# Patient Record
Sex: FEMALE | Race: ASIAN
[De-identification: names, ages, dates, MRNs, and addresses within clinical notes are randomized per-mention and may not be internally consistent; named-entity substitution may affect disease eponyms.]

---

## 2021-01-19 ENCOUNTER — HOSPITAL ENCOUNTER (OUTPATIENT)
Dept: HOSPITAL 8 - WOUND | Age: 66
Discharge: HOME | End: 2021-01-19
Attending: NURSE PRACTITIONER
Payer: COMMERCIAL
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
## 2021-01-19 DIAGNOSIS — R21: Primary | ICD-10-CM

## 2021-01-19 DIAGNOSIS — I10: ICD-10-CM

## 2021-01-19 DIAGNOSIS — L08.89: ICD-10-CM
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
## 2021-01-19 DIAGNOSIS — L27.1: ICD-10-CM

## 2021-01-19 DIAGNOSIS — T46.5X5A: ICD-10-CM

## 2021-01-19 DIAGNOSIS — Z96.659: ICD-10-CM

## 2021-01-19 DIAGNOSIS — H26.9: ICD-10-CM

## 2021-01-19 PROCEDURE — 99214 OFFICE O/P EST MOD 30 MIN: CPT

## 2021-01-26 ENCOUNTER — HOSPITAL ENCOUNTER (OUTPATIENT)
Dept: HOSPITAL 8 - WOUND | Age: 66
Discharge: HOME | End: 2021-01-26
Attending: NURSE PRACTITIONER
Payer: COMMERCIAL

## 2021-01-26 DIAGNOSIS — H26.9: ICD-10-CM

## 2021-01-26 DIAGNOSIS — R21: Primary | ICD-10-CM

## 2021-01-26 DIAGNOSIS — I10: ICD-10-CM

## 2021-01-26 DIAGNOSIS — T46.5X5D: ICD-10-CM

## 2021-01-26 DIAGNOSIS — Z96.659: ICD-10-CM

## 2021-01-26 DIAGNOSIS — L27.1: ICD-10-CM
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
## 2021-01-26 DIAGNOSIS — L08.89: ICD-10-CM

## 2021-01-26 PROCEDURE — 99213 OFFICE O/P EST LOW 20 MIN: CPT

## 2021-03-22 ENCOUNTER — HOSPITAL ENCOUNTER (OUTPATIENT)
Dept: HOSPITAL 8 - WOUND | Age: 66
Discharge: HOME | End: 2021-03-22
Attending: INTERNAL MEDICINE
Payer: COMMERCIAL

## 2021-03-22 DIAGNOSIS — L97.511: ICD-10-CM

## 2021-03-22 DIAGNOSIS — I10: ICD-10-CM

## 2021-03-22 DIAGNOSIS — L03.032: ICD-10-CM

## 2021-03-22 DIAGNOSIS — L03.116: ICD-10-CM

## 2021-03-22 DIAGNOSIS — Z79.899: ICD-10-CM

## 2021-03-22 DIAGNOSIS — Z98.49: ICD-10-CM

## 2021-03-22 DIAGNOSIS — L97.522: Primary | ICD-10-CM

## 2021-03-22 DIAGNOSIS — R21: ICD-10-CM

## 2021-03-22 DIAGNOSIS — Z96.659: ICD-10-CM

## 2021-03-22 DIAGNOSIS — L03.115: ICD-10-CM

## 2021-03-22 PROCEDURE — 97597 DBRDMT OPN WND 1ST 20 CM/<: CPT

## 2021-03-22 PROCEDURE — 97598 DBRDMT OPN WND ADDL 20CM/<: CPT

## 2021-03-22 PROCEDURE — 99215 OFFICE O/P EST HI 40 MIN: CPT

## 2021-03-29 ENCOUNTER — HOSPITAL ENCOUNTER (OUTPATIENT)
Dept: HOSPITAL 8 - WOUND | Age: 66
Discharge: HOME | End: 2021-03-29
Attending: INTERNAL MEDICINE
Payer: COMMERCIAL

## 2021-03-29 DIAGNOSIS — I10: ICD-10-CM

## 2021-03-29 DIAGNOSIS — L03.116: ICD-10-CM

## 2021-03-29 DIAGNOSIS — Z96.659: ICD-10-CM

## 2021-03-29 DIAGNOSIS — L03.115: ICD-10-CM

## 2021-03-29 DIAGNOSIS — L03.032: ICD-10-CM

## 2021-03-29 DIAGNOSIS — Z79.899: ICD-10-CM

## 2021-03-29 DIAGNOSIS — Z98.49: ICD-10-CM

## 2021-03-29 DIAGNOSIS — R21: ICD-10-CM

## 2021-03-29 DIAGNOSIS — L97.512: ICD-10-CM

## 2021-03-29 DIAGNOSIS — L97.522: Primary | ICD-10-CM

## 2021-03-29 PROCEDURE — 97597 DBRDMT OPN WND 1ST 20 CM/<: CPT

## 2021-03-29 PROCEDURE — 97598 DBRDMT OPN WND ADDL 20CM/<: CPT

## 2021-04-05 ENCOUNTER — HOSPITAL ENCOUNTER (OUTPATIENT)
Dept: HOSPITAL 8 - WOUND | Age: 66
Discharge: HOME | End: 2021-04-05
Attending: INTERNAL MEDICINE
Payer: COMMERCIAL

## 2021-04-05 DIAGNOSIS — I10: ICD-10-CM

## 2021-04-05 DIAGNOSIS — Z96.659: ICD-10-CM

## 2021-04-05 DIAGNOSIS — Z79.899: ICD-10-CM

## 2021-04-05 DIAGNOSIS — R21: ICD-10-CM

## 2021-04-05 DIAGNOSIS — L97.522: Primary | ICD-10-CM

## 2021-04-05 DIAGNOSIS — Z98.49: ICD-10-CM

## 2021-04-05 DIAGNOSIS — L97.512: ICD-10-CM

## 2021-04-05 PROCEDURE — 99212 OFFICE O/P EST SF 10 MIN: CPT

## 2021-04-06 ENCOUNTER — APPOINTMENT (RX ONLY)
Dept: URBAN - METROPOLITAN AREA CLINIC 4 | Facility: CLINIC | Age: 66
Setting detail: DERMATOLOGY
End: 2021-04-06

## 2021-04-06 DIAGNOSIS — L30.9 DERMATITIS, UNSPECIFIED: ICD-10-CM | Status: INADEQUATELY CONTROLLED

## 2021-04-06 PROCEDURE — ? KOH PREP

## 2021-04-06 PROCEDURE — ? COUNSELING

## 2021-04-06 PROCEDURE — ? PRESCRIPTION

## 2021-04-06 PROCEDURE — 99204 OFFICE O/P NEW MOD 45 MIN: CPT

## 2021-04-06 PROCEDURE — ? REFERRAL CORRESPONDENCE

## 2021-04-06 PROCEDURE — ? TREATMENT REGIMEN

## 2021-04-06 RX ORDER — CLOBETASOL PROPIONATE 0.5 MG/G
1 OINTMENT TOPICAL BID
Qty: 1 | Refills: 3 | Status: ERX | COMMUNITY
Start: 2021-04-06

## 2021-04-06 RX ADMIN — CLOBETASOL PROPIONATE 1: 0.5 OINTMENT TOPICAL at 00:00

## 2021-04-06 ASSESSMENT — LOCATION DETAILED DESCRIPTION DERM
LOCATION DETAILED: RIGHT DORSAL FOOT
LOCATION DETAILED: LEFT DORSAL FOOT
LOCATION DETAILED: LEFT LATERAL DORSAL FOOT

## 2021-04-06 ASSESSMENT — LOCATION SIMPLE DESCRIPTION DERM
LOCATION SIMPLE: LEFT FOOT
LOCATION SIMPLE: RIGHT FOOT

## 2021-04-06 ASSESSMENT — LOCATION ZONE DERM: LOCATION ZONE: FEET

## 2021-04-06 NOTE — HPI: RASH
What Type Of Note Output Would You Prefer (Optional)?: Bullet Format
Is The Patient Presenting As Previously Scheduled?: Yes
How Severe Is Your Rash?: mild
Is This A New Presentation, Or A Follow-Up?: Rash
Additional History: Previous medications include methyprednisone, cipro, Bactrim, Triamcinolone, Fluocinonide cream

## 2021-04-06 NOTE — PROCEDURE: KOH PREP
Detail Level: Zone
Cpt Desired: 
Showing: fungal hyphal elements: negative
Koh Intro Text (From The.....): A KOH prep was ordered and evaluated from the
Koh Procedure Text (Tissue Harvesting Technique): A 15-blade scalpel was used to scrape the skin. The skin scrapings were placed on a glass slide, covered with a coverslip and a KOH solution was applied.

## 2021-04-06 NOTE — PROCEDURE: TREATMENT REGIMEN
Plan: She finished internal steroid 1 week ago so I will have her prescribe a stronger topical steroid to see if we can get this under control topically first.
Detail Level: Zone
Discontinue Regimen: Silvadine cream
Initiate Treatment: Clobetasol ointment bid for 3 weeks and will have her follow up at that time. Pt to soak feet for 10 minutes in warm water prior to application.

## 2021-04-27 ENCOUNTER — APPOINTMENT (RX ONLY)
Dept: URBAN - METROPOLITAN AREA CLINIC 4 | Facility: CLINIC | Age: 66
Setting detail: DERMATOLOGY
End: 2021-04-27

## 2021-04-27 DIAGNOSIS — L81.0 POSTINFLAMMATORY HYPERPIGMENTATION: ICD-10-CM

## 2021-04-27 DIAGNOSIS — L20.89 OTHER ATOPIC DERMATITIS: ICD-10-CM | Status: IMPROVED

## 2021-04-27 PROBLEM — L20.84 INTRINSIC (ALLERGIC) ECZEMA: Status: ACTIVE | Noted: 2021-04-27

## 2021-04-27 PROCEDURE — 99213 OFFICE O/P EST LOW 20 MIN: CPT

## 2021-04-27 PROCEDURE — ? COUNSELING

## 2021-04-27 PROCEDURE — ? REFERRAL CORRESPONDENCE

## 2021-04-27 PROCEDURE — ? TREATMENT REGIMEN

## 2021-04-27 ASSESSMENT — LOCATION SIMPLE DESCRIPTION DERM
LOCATION SIMPLE: RIGHT PLANTAR SURFACE
LOCATION SIMPLE: LEFT PLANTAR SURFACE

## 2021-04-27 ASSESSMENT — LOCATION DETAILED DESCRIPTION DERM
LOCATION DETAILED: LEFT MEDIAL PLANTAR MIDFOOT
LOCATION DETAILED: RIGHT MEDIAL PLANTAR MIDFOOT
LOCATION DETAILED: LEFT PLANTAR FOREFOOT OVERLYING 3RD METATARSAL

## 2021-04-27 ASSESSMENT — LOCATION ZONE DERM: LOCATION ZONE: FEET

## 2021-04-27 NOTE — PROCEDURE: TREATMENT REGIMEN
Initiate Treatment: Use a moisturizer cream
Continue Regimen: Clobetasol twice daily , take one week off and repeat as needed.
Detail Level: Zone

## 2021-07-27 ENCOUNTER — APPOINTMENT (RX ONLY)
Dept: URBAN - METROPOLITAN AREA CLINIC 4 | Facility: CLINIC | Age: 66
Setting detail: DERMATOLOGY
End: 2021-07-27

## 2021-07-27 DIAGNOSIS — L81.0 POSTINFLAMMATORY HYPERPIGMENTATION: ICD-10-CM | Status: UNCHANGED

## 2021-07-27 DIAGNOSIS — L20.89 OTHER ATOPIC DERMATITIS: ICD-10-CM | Status: IMPROVED

## 2021-07-27 PROBLEM — L20.84 INTRINSIC (ALLERGIC) ECZEMA: Status: ACTIVE | Noted: 2021-07-27

## 2021-07-27 PROCEDURE — ? COUNSELING

## 2021-07-27 PROCEDURE — ? REFERRAL CORRESPONDENCE

## 2021-07-27 PROCEDURE — ? PRESCRIPTION

## 2021-07-27 PROCEDURE — ? TREATMENT REGIMEN

## 2021-07-27 PROCEDURE — ? PHOTO-DOCUMENTATION

## 2021-07-27 PROCEDURE — 99213 OFFICE O/P EST LOW 20 MIN: CPT

## 2021-07-27 RX ORDER — CLOBETASOL PROPIONATE 0.5 MG/G
OINTMENT TOPICAL BID
Qty: 1 | Refills: 3 | Status: ERX | COMMUNITY
Start: 2021-07-27

## 2021-07-27 RX ADMIN — CLOBETASOL PROPIONATE 1: 0.5 OINTMENT TOPICAL at 00:00

## 2021-07-27 ASSESSMENT — LOCATION DETAILED DESCRIPTION DERM
LOCATION DETAILED: LEFT MEDIAL PLANTAR MIDFOOT
LOCATION DETAILED: LEFT PLANTAR FOREFOOT OVERLYING 3RD METATARSAL
LOCATION DETAILED: RIGHT MEDIAL PLANTAR MIDFOOT

## 2021-07-27 ASSESSMENT — LOCATION SIMPLE DESCRIPTION DERM
LOCATION SIMPLE: RIGHT PLANTAR SURFACE
LOCATION SIMPLE: LEFT PLANTAR SURFACE

## 2021-07-27 ASSESSMENT — LOCATION ZONE DERM: LOCATION ZONE: FEET

## 2021-07-27 NOTE — PROCEDURE: TREATMENT REGIMEN
Continue Regimen: Clobetasol twice daily for two weeks, then take one week off and repeat as needed. Use a moisturizer cream everyday.
Detail Level: Zone